# Patient Record
Sex: FEMALE | Race: OTHER | URBAN - METROPOLITAN AREA
[De-identification: names, ages, dates, MRNs, and addresses within clinical notes are randomized per-mention and may not be internally consistent; named-entity substitution may affect disease eponyms.]

---

## 2022-10-12 ENCOUNTER — TECH ONLY (OUTPATIENT)
Dept: URBAN - METROPOLITAN AREA CLINIC 50 | Facility: CLINIC | Age: 42
End: 2022-10-12

## 2022-10-12 DIAGNOSIS — H52.4: ICD-10-CM

## 2022-10-12 PROCEDURE — 92015 DETERMINE REFRACTIVE STATE: CPT

## 2025-05-30 ENCOUNTER — COMPREHENSIVE EXAM (OUTPATIENT)
Age: 45
End: 2025-05-30

## 2025-05-30 DIAGNOSIS — H04.122: ICD-10-CM

## 2025-05-30 DIAGNOSIS — H52.4: ICD-10-CM

## 2025-05-30 DIAGNOSIS — H47.321: ICD-10-CM

## 2025-05-30 PROCEDURE — 92014EYE ESTABLISHED PATIENT - EMPLOYEE ROUTINE COMP EXAM
